# Patient Record
Sex: FEMALE | Employment: OTHER | URBAN - METROPOLITAN AREA
[De-identification: names, ages, dates, MRNs, and addresses within clinical notes are randomized per-mention and may not be internally consistent; named-entity substitution may affect disease eponyms.]

---

## 2017-04-03 ENCOUNTER — OFFICE VISIT (OUTPATIENT)
Dept: SURGERY | Age: 42
End: 2017-04-03

## 2017-04-03 VITALS
BODY MASS INDEX: 37.61 KG/M2 | SYSTOLIC BLOOD PRESSURE: 150 MMHG | OXYGEN SATURATION: 99 % | WEIGHT: 234 LBS | HEIGHT: 66 IN | HEART RATE: 66 BPM | DIASTOLIC BLOOD PRESSURE: 87 MMHG

## 2017-04-03 DIAGNOSIS — R11.2 NAUSEA AND VOMITING, INTRACTABILITY OF VOMITING NOT SPECIFIED, UNSPECIFIED VOMITING TYPE: Primary | ICD-10-CM

## 2017-04-03 RX ORDER — BISOPROLOL FUMARATE 10 MG/1
10 TABLET ORAL DAILY
COMMUNITY

## 2017-04-03 RX ORDER — NIFEDIPINE 60 MG/1
60 TABLET, EXTENDED RELEASE ORAL DAILY
COMMUNITY

## 2017-04-03 NOTE — PATIENT INSTRUCTIONS
Body Mass Index: Care Instructions  Your Care Instructions    Body mass index (BMI) can help you see if your weight is raising your risk for health problems. It uses a formula to compare how much you weigh with how tall you are. · A BMI lower than 18.5 is considered underweight. · A BMI between 18.5 and 24.9 is considered healthy. · A BMI between 25 and 29.9 is considered overweight. A BMI of 30 or higher is considered obese. If your BMI is in the normal range, it means that you have a lower risk for weight-related health problems. If your BMI is in the overweight or obese range, you may be at increased risk for weight-related health problems, such as high blood pressure, heart disease, stroke, arthritis or joint pain, and diabetes. If your BMI is in the underweight range, you may be at increased risk for health problems such as fatigue, lower protection (immunity) against illness, muscle loss, bone loss, hair loss, and hormone problems. BMI is just one measure of your risk for weight-related health problems. You may be at higher risk for health problems if you are not active, you eat an unhealthy diet, or you drink too much alcohol or use tobacco products. Follow-up care is a key part of your treatment and safety. Be sure to make and go to all appointments, and call your doctor if you are having problems. It's also a good idea to know your test results and keep a list of the medicines you take. How can you care for yourself at home? · Practice healthy eating habits. This includes eating plenty of fruits, vegetables, whole grains, lean protein, and low-fat dairy. · If your doctor recommends it, get more exercise. Walking is a good choice. Bit by bit, increase the amount you walk every day. Try for at least 30 minutes on most days of the week. · Do not smoke. Smoking can increase your risk for health problems. If you need help quitting, talk to your doctor about stop-smoking programs and medicines. These can increase your chances of quitting for good. · Limit alcohol to 2 drinks a day for men and 1 drink a day for women. Too much alcohol can cause health problems. If you have a BMI higher than 25  · Your doctor may do other tests to check your risk for weight-related health problems. This may include measuring the distance around your waist. A waist measurement of more than 40 inches in men or 35 inches in women can increase the risk of weight-related health problems. · Talk with your doctor about steps you can take to stay healthy or improve your health. You may need to make lifestyle changes to lose weight and stay healthy, such as changing your diet and getting regular exercise. If you have a BMI lower than 18.5  · Your doctor may do other tests to check your risk for health problems. · Talk with your doctor about steps you can take to stay healthy or improve your health. You may need to make lifestyle changes to gain or maintain weight and stay healthy, such as getting more healthy foods in your diet and doing exercises to build muscle. Where can you learn more? Go to http://jassi-johanna.info/. Enter S176 in the search box to learn more about \"Body Mass Index: Care Instructions. \"  Current as of: January 23, 2017  Content Version: 11.2  © 5451-0571 [employer]. Care instructions adapted under license by [employer] (which disclaims liability or warranty for this information). If you have questions about a medical condition or this instruction, always ask your healthcare professional. [place] [address] any warranty or liability for your use of this information.

## 2017-04-04 NOTE — PROGRESS NOTES
9.5 years follow-up    Subjective:     Mone Schwarz  is a 39 y.o. female who presents for follow-up about 9.5 years following laparoscopic adjustable gastric band surgery. She has lost a total of 30 pounds since surgery. Body mass index is 37.77 kg/(m^2). . EBWL is (24%). The patient presents today to assess their progress toward their goal of weight loss and to address any issues that may be present. Today the patient and I have reviewed their diet and how appropriate their food choices are. The following issues have been identified - pt lost to F/u since 3/2012. Here today with 2 week history or spontaneous obstructive issues which are progressing to where she is now having trouble tolerating liquids. Pain assessment - 0/10  . Surgery related complication: NA       She reports a progressive worsening to PO intake and denies abdominal pain, diarrhea and difficulty breathing. The patient's exercise level: moderately active. Changes in her medical history and medications have been reviewed. Patient Active Problem List   Diagnosis Code    Status post gastric banding Z98.84    Infertility, female N80.10     Past Medical History:   Diagnosis Date    Hypertension     Infertility, female     currently going through fertility treatments    Morbid obesity ([facility] [place] [address]     Status post gastric banding      Past Surgical History:   Procedure Laterality Date    HX  SECTION      HX LAP CHOLECYSTECTOMY      LAP, PLACE ADJUST GASTR BAND  07     Current Outpatient Prescriptions   Medication Sig Dispense Refill    bisoprolol (ZEBETA) 10 mg tablet Take 10 mg by mouth daily.  NIFEdipine ER (PROCARDIA XL) 60 mg ER tablet Take 60 mg by mouth daily.  ERGOCALCIFEROL, VITAMIN D2, (VITAMIN D2 PO) Take  by mouth.  ROYAL JELLY-BEE POLLEN PO Take  by mouth.  UBIDECARENONE (COQ-10 PO) Take  by mouth.       PNV no.24-iron-folic acid-dha (PRENATAL DHA+COMPLETE PRENATAL) -342 mg-mcg-mg cmpk Take  by mouth. Review of Symptoms:     General - No history or complaints of unexpected fever or chills  Head/Neck - No history or complaints of headache or dizziness  Cardiac - No history or complaints of chest pain, palpitations, or shortness of breath  Pulmonary - No history or complaints of shortness of breath or productive cough  Gastrointestinal - as noted above  Genitourinary - No history or complaints of hematuria/dysuria or renal lithiasis  Musculoskeletal - No history or complaints of joint  muscular weakness  Hematologic - No history of any bleeding episodes  Neurologic - No history or complaints of  migraine headaches or neurologic symptoms    Objective:     Visit Vitals    /87 (BP 1 Location: Left arm, BP Patient Position: Sitting)    Pulse 66    Ht 5' 6\" (1.676 m)    Wt 106.1 kg (234 lb)    SpO2 99%    BMI 37.77 kg/m2        Physical Exam:    General:  alert, cooperative, no distress, appears stated age   Lungs:   clear to auscultation bilaterally   Heart:  Regular rate and rhythm   Abdomen:   abdomen is soft without significant tenderness, masses, organomegaly or guarding; Incisions: healing well, no significant drainage         Labs:     No results found for this or any previous visit (from the past 2016 hour(s)). Assessment:     1. History of Morbid obesity, status post  laparoscopic adjustable gastric band surgery. The patient will need an office based unfill. Plan:     1. Remember to measure portions, continue low carbohydrate diet  2. See smart set for plan  3. Remember vitamin supplements. 4. Exercise regimen appears adequate. 5. Attend support group  6. Follow-up in a few week(s) in the clinic for UGI. 7. The patient understands the plan of action  8. Total time spent with the patient 30 minutes.       [facility]  OFFICE PROCEDURE PROGRESS NOTE        Chart reviewed for the following:   Sarai Davalos DERIAN Christina, have reviewed the History, Physical and updated the Allergic reactions for Veronica Baezase 89 performed immediately prior to start of procedure:   Cate RAMAN PA, have performed the following reviews on Sandy Ozuna prior to the start of the procedure:            * Patient was identified by name and date of birth   * Agreement on procedure being performed was verified  * Risks and Benefits explained to the patient  * Procedure site verified and marked as necessary  * Patient was positioned for comfort  * Consent was signed and verified     Time: 5517      Date of procedure: 4/3/2017    Procedure performed by:  Juan Carlos Velasquez PA-C    Patient assisted by: self    How tolerated by patient: tolerated the procedure well with no complications    Post Procedural Pain Scale: 0 - No Hurt    Comments: none            Subjective:   Sandy Ozuna is a 39 y.o. female with previous lap band surgery, who is here today needing lap band adjustment because of productive burping, difficulty swallowing, unable to tolerate liquids and unable to tolerate solids. Dietary compliance is fair. Objective:     Visit Vitals    /87 (BP 1 Location: Left arm, BP Patient Position: Sitting)    Pulse 66    Ht 5' 6\" (1.676 m)    Wt 106.1 kg (234 lb)    SpO2 99%    BMI 37.77 kg/m2      Estimated body mass index is 37.77 kg/(m^2) as calculated from the following:    Height as of this encounter: 5' 6\" (1.676 m). Weight as of this encounter: 106.1 kg (234 lb). Wt Readings from Last 3 Encounters:   04/03/17 106.1 kg (234 lb)   03/07/12 104.3 kg (230 lb)   12/17/08 102.1 kg (225 lb 1.6 oz)     Her change in weight since last visit: gained, 4 lbs. The surgical area looks well healed and the port is properly located. Band Position: NA (normal as of 3/2012). Assessment/Plan:    Morbid Obesity, status post lap-band surgery, needing fill adjustment    Lap Band FiIl Procedure    The port area was cleaned with alcohol and a 25  gauge needle was inserted. Previous Fill Volume:  6.75 cc. Removed:  0.5 cc   Total amount now in Band 6.25 ml    Patient tolerated the procedure well. Follow up in 4 to 6 weeks as needed.

## 2019-03-06 ENCOUNTER — APPOINTMENT (OUTPATIENT)
Dept: GENERAL RADIOLOGY | Age: 44
End: 2019-03-06
Attending: SPECIALIST
Payer: SELF-PAY

## 2019-03-06 ENCOUNTER — HOSPITAL ENCOUNTER (OUTPATIENT)
Age: 44
Setting detail: OUTPATIENT SURGERY
Discharge: HOME | End: 2019-03-06
Attending: SPECIALIST | Admitting: SPECIALIST
Payer: SELF-PAY

## 2019-03-06 VITALS
WEIGHT: 247 LBS | HEIGHT: 67 IN | BODY MASS INDEX: 38.77 KG/M2 | OXYGEN SATURATION: 100 % | HEART RATE: 65 BPM | DIASTOLIC BLOOD PRESSURE: 110 MMHG | TEMPERATURE: 97.1 F | RESPIRATION RATE: 16 BRPM | SYSTOLIC BLOOD PRESSURE: 160 MMHG

## 2019-03-06 DIAGNOSIS — Z46.51 FITTING AND ADJUSTMENT OF GASTRIC LAP BAND: ICD-10-CM

## 2019-03-06 PROCEDURE — 43999 UNLISTED PROCEDURE STOMACH: CPT | Performed by: SPECIALIST

## 2019-03-06 PROCEDURE — 76040000019: Performed by: SPECIALIST

## 2019-03-06 PROCEDURE — 74011250636 HC RX REV CODE- 250/636: Performed by: SPECIALIST

## 2019-03-06 PROCEDURE — 76000 FLUOROSCOPY <1 HR PHYS/QHP: CPT

## 2019-03-06 PROCEDURE — 74011000255 HC RX REV CODE- 255: Performed by: SPECIALIST

## 2019-03-06 PROCEDURE — 77030032490 HC SLV COMPR SCD KNE COVD -B: Performed by: SPECIALIST

## 2019-03-06 RX ORDER — LIDOCAINE HYDROCHLORIDE 10 MG/ML
INJECTION INFILTRATION; PERINEURAL AS NEEDED
Status: DISCONTINUED | OUTPATIENT
Start: 2019-03-06 | End: 2019-03-06 | Stop reason: HOSPADM

## 2019-03-06 NOTE — PROCEDURES
Lap Band Encounter (fluroscopy clinic)    Rose Lawson is gastric banding patient who had her procedure on  .  her weight today is 112 kg (247 lb), which correlates to  % EBW loss. she is here today for Lap Band Adjustment / Fill with Fluoroscopy Guidance. she notes the following issues related to the banding procedure; - here for routine adjustment. Surgery related complications; NA    Visit Vitals  BP (!) 160/110 Comment: manual to R ARM SITTING DOWN   Pulse 65   Temp 97.1 °F (36.2 °C)   Resp 16   Ht 5' 7\" (1.702 m)   Wt 112 kg (247 lb)   SpO2 100%   BMI 38.69 kg/m²       Past Medical History:   Diagnosis Date    Hypertension     Infertility, female     currently going through fertility treatments    [facility] ([facility] [address]     Status post gastric banding      Past Surgical History:   Procedure Laterality Date    HX  SECTION      HX LAP CHOLECYSTECTOMY      LAP, PLACE ADJUST GASTR BAND  07     Current Facility-Administered Medications   Medication Dose Route Frequency Provider Last Rate Last Dose    barium sulfate (EZ PAQUE) 60 % (w/v) contrast susp    PRN Zara Monson MD   100 mL at 19 1517    lidocaine (XYLOCAINE) 10 mg/mL (1 %) injection    PRN Zara Monson MD   1.5 mL at 19 1518          Review of Symptoms:     General - No history or complaints of unexpected fever or chills  Cardiac - No history or complaints of chest pain, palpitations, or shortness of breath  Pulmonary - No history or complaints of shortness of breath or productive cough  Gastrointestinal - as noted above        Physical Exam:    General:  alert, cooperative, no distress, appears stated age   Abdomen:   abdomen is soft without significant tenderness, masses, organomegaly or guarding; port in place   Incisions: healing well, no significant drainage       Assessment:     1.  History of Morbid obesity, status post gastric banding, given the fluro findings we will proceed with the following adjustment. Plan:     Previous Fill Volume: 6.25 ml   Removed:       Total fill volume after today's adjustment: 6.85  Added: 0.6 ml                Follow-up in PRN

## (undated) DEVICE — STERILE POLYISOPRENE POWDER-FREE SURGICAL GLOVES: Brand: PROTEXIS

## (undated) DEVICE — KENDALL SCD EXPRESS SLEEVES, KNEE LENGTH, MEDIUM: Brand: KENDALL SCD

## (undated) DEVICE — GOWN ISOLATN REG BLU POLY UNISX W/ THMB LOOP

## (undated) DEVICE — MAJ-1414 SINGLE USE ADPATER BIOPSY VALV: Brand: SINGLE USE ADAPTOR BIOPSY VALVE

## (undated) DEVICE — DEVON™ KNEE AND BODY STRAP 60" X 3" (1.5 M X 7.6 CM): Brand: DEVON